# Patient Record
Sex: MALE | Race: ASIAN | NOT HISPANIC OR LATINO | URBAN - METROPOLITAN AREA
[De-identification: names, ages, dates, MRNs, and addresses within clinical notes are randomized per-mention and may not be internally consistent; named-entity substitution may affect disease eponyms.]

---

## 2017-01-03 ENCOUNTER — INPATIENT (INPATIENT)
Facility: HOSPITAL | Age: 22
LOS: 26 days | Discharge: HOME | End: 2017-01-30
Attending: PSYCHIATRY & NEUROLOGY | Admitting: PSYCHIATRY & NEUROLOGY
Payer: COMMERCIAL

## 2017-01-03 VITALS
RESPIRATION RATE: 16 BRPM | OXYGEN SATURATION: 100 % | SYSTOLIC BLOOD PRESSURE: 116 MMHG | TEMPERATURE: 98 F | HEART RATE: 99 BPM | DIASTOLIC BLOOD PRESSURE: 82 MMHG

## 2017-01-03 DIAGNOSIS — R69 ILLNESS, UNSPECIFIED: ICD-10-CM

## 2017-01-03 DIAGNOSIS — F32.9 MAJOR DEPRESSIVE DISORDER, SINGLE EPISODE, UNSPECIFIED: ICD-10-CM

## 2017-01-03 LAB
ALBUMIN SERPL ELPH-MCNC: 4.6 G/DL — SIGNIFICANT CHANGE UP (ref 3.3–5)
ALP SERPL-CCNC: 78 U/L — SIGNIFICANT CHANGE UP (ref 40–120)
ALT FLD-CCNC: 14 U/L — SIGNIFICANT CHANGE UP (ref 4–41)
AMPHET UR-MCNC: NEGATIVE — SIGNIFICANT CHANGE UP
APAP SERPL-MCNC: < 15 UG/ML — LOW (ref 15–25)
APPEARANCE UR: CLEAR — SIGNIFICANT CHANGE UP
AST SERPL-CCNC: 16 U/L — SIGNIFICANT CHANGE UP (ref 4–40)
BACTERIA # UR AUTO: SIGNIFICANT CHANGE UP
BARBITURATES MEASUREMENT: NEGATIVE — SIGNIFICANT CHANGE UP
BARBITURATES UR SCN-MCNC: NEGATIVE — SIGNIFICANT CHANGE UP
BASOPHILS # BLD AUTO: 0 K/UL — SIGNIFICANT CHANGE UP (ref 0–0.2)
BASOPHILS NFR BLD AUTO: 0 % — SIGNIFICANT CHANGE UP (ref 0–2)
BENZODIAZ SERPL-MCNC: NEGATIVE — SIGNIFICANT CHANGE UP
BENZODIAZ UR-MCNC: NEGATIVE — SIGNIFICANT CHANGE UP
BILIRUB SERPL-MCNC: 0.5 MG/DL — SIGNIFICANT CHANGE UP (ref 0.2–1.2)
BILIRUB UR-MCNC: SIGNIFICANT CHANGE UP
BLOOD UR QL VISUAL: HIGH
BUN SERPL-MCNC: 7 MG/DL — SIGNIFICANT CHANGE UP (ref 7–23)
CALCIUM SERPL-MCNC: 9.3 MG/DL — SIGNIFICANT CHANGE UP (ref 8.4–10.5)
CANNABINOIDS UR-MCNC: NEGATIVE — SIGNIFICANT CHANGE UP
CHLORIDE SERPL-SCNC: 101 MMOL/L — SIGNIFICANT CHANGE UP (ref 98–107)
CO2 SERPL-SCNC: 21 MMOL/L — LOW (ref 22–31)
COCAINE METAB.OTHER UR-MCNC: NEGATIVE — SIGNIFICANT CHANGE UP
COLOR SPEC: YELLOW — SIGNIFICANT CHANGE UP
CREAT SERPL-MCNC: 1.16 MG/DL — SIGNIFICANT CHANGE UP (ref 0.5–1.3)
EOSINOPHIL # BLD AUTO: 0.06 K/UL — SIGNIFICANT CHANGE UP (ref 0–0.5)
EOSINOPHIL NFR BLD AUTO: 0.6 % — SIGNIFICANT CHANGE UP (ref 0–6)
ETHANOL BLD-MCNC: < 10 MG/DL — SIGNIFICANT CHANGE UP
GLUCOSE SERPL-MCNC: 109 MG/DL — HIGH (ref 70–99)
GLUCOSE UR-MCNC: NEGATIVE — SIGNIFICANT CHANGE UP
HCT VFR BLD CALC: 41.5 % — SIGNIFICANT CHANGE UP (ref 39–50)
HGB BLD-MCNC: 13.9 G/DL — SIGNIFICANT CHANGE UP (ref 13–17)
IMM GRANULOCYTES NFR BLD AUTO: 0.3 % — SIGNIFICANT CHANGE UP (ref 0–1.5)
KETONES UR-MCNC: SIGNIFICANT CHANGE UP
LEUKOCYTE ESTERASE UR-ACNC: NEGATIVE — SIGNIFICANT CHANGE UP
LYMPHOCYTES # BLD AUTO: 3.86 K/UL — HIGH (ref 1–3.3)
LYMPHOCYTES # BLD AUTO: 39.8 % — SIGNIFICANT CHANGE UP (ref 13–44)
MCHC RBC-ENTMCNC: 28.1 PG — SIGNIFICANT CHANGE UP (ref 27–34)
MCHC RBC-ENTMCNC: 33.5 % — SIGNIFICANT CHANGE UP (ref 32–36)
MCV RBC AUTO: 84 FL — SIGNIFICANT CHANGE UP (ref 80–100)
METHADONE UR-MCNC: NEGATIVE — SIGNIFICANT CHANGE UP
MONOCYTES # BLD AUTO: 0.69 K/UL — SIGNIFICANT CHANGE UP (ref 0–0.9)
MONOCYTES NFR BLD AUTO: 7.1 % — SIGNIFICANT CHANGE UP (ref 2–14)
MUCOUS THREADS # UR AUTO: SIGNIFICANT CHANGE UP
NEUTROPHILS # BLD AUTO: 5.06 K/UL — SIGNIFICANT CHANGE UP (ref 1.8–7.4)
NEUTROPHILS NFR BLD AUTO: 52.2 % — SIGNIFICANT CHANGE UP (ref 43–77)
NITRITE UR-MCNC: NEGATIVE — SIGNIFICANT CHANGE UP
NON-SQ EPI CELLS # UR AUTO: <1 — SIGNIFICANT CHANGE UP
OPIATES UR-MCNC: NEGATIVE — SIGNIFICANT CHANGE UP
OXYCODONE UR-MCNC: NEGATIVE — SIGNIFICANT CHANGE UP
PCP UR-MCNC: NEGATIVE — SIGNIFICANT CHANGE UP
PH UR: 6.5 — SIGNIFICANT CHANGE UP (ref 4.6–8)
PLATELET # BLD AUTO: 293 K/UL — SIGNIFICANT CHANGE UP (ref 150–400)
PMV BLD: 10.5 FL — SIGNIFICANT CHANGE UP (ref 7–13)
POTASSIUM SERPL-MCNC: 3.5 MMOL/L — SIGNIFICANT CHANGE UP (ref 3.5–5.3)
POTASSIUM SERPL-SCNC: 3.5 MMOL/L — SIGNIFICANT CHANGE UP (ref 3.5–5.3)
PROT SERPL-MCNC: 7.6 G/DL — SIGNIFICANT CHANGE UP (ref 6–8.3)
PROT UR-MCNC: 30 — SIGNIFICANT CHANGE UP
RBC # BLD: 4.94 M/UL — SIGNIFICANT CHANGE UP (ref 4.2–5.8)
RBC # FLD: 13.1 % — SIGNIFICANT CHANGE UP (ref 10.3–14.5)
RBC CASTS # UR COMP ASSIST: SIGNIFICANT CHANGE UP (ref 0–?)
SALICYLATES SERPL-MCNC: < 15 MG/DL — LOW (ref 15–30)
SODIUM SERPL-SCNC: 141 MMOL/L — SIGNIFICANT CHANGE UP (ref 135–145)
SP GR SPEC: 1.03 — SIGNIFICANT CHANGE UP (ref 1–1.03)
SQUAMOUS # UR AUTO: SIGNIFICANT CHANGE UP
TSH SERPL-MCNC: 1.82 UIU/ML — SIGNIFICANT CHANGE UP (ref 0.27–4.2)
UROBILINOGEN FLD QL: 2 E.U. — SIGNIFICANT CHANGE UP (ref 0.1–0.2)
WBC # BLD: 9.7 K/UL — SIGNIFICANT CHANGE UP (ref 3.8–10.5)
WBC # FLD AUTO: 9.7 K/UL — SIGNIFICANT CHANGE UP (ref 3.8–10.5)
WBC UR QL: SIGNIFICANT CHANGE UP (ref 0–?)

## 2017-01-03 PROCEDURE — 99285 EMERGENCY DEPT VISIT HI MDM: CPT | Mod: GC

## 2017-01-03 RX ORDER — ALBUTEROL 90 UG/1
2 AEROSOL, METERED ORAL EVERY 6 HOURS
Qty: 0 | Refills: 0 | Status: DISCONTINUED | OUTPATIENT
Start: 2017-01-03 | End: 2017-01-30

## 2017-01-03 RX ORDER — DIPHENHYDRAMINE HCL 50 MG
50 CAPSULE ORAL ONCE
Qty: 0 | Refills: 0 | Status: COMPLETED | OUTPATIENT
Start: 2017-01-03 | End: 2017-01-03

## 2017-01-03 RX ORDER — SERTRALINE 25 MG/1
25 TABLET, FILM COATED ORAL DAILY
Qty: 0 | Refills: 0 | Status: DISCONTINUED | OUTPATIENT
Start: 2017-01-03 | End: 2017-01-05

## 2017-01-03 RX ORDER — PANTOPRAZOLE SODIUM 20 MG/1
40 TABLET, DELAYED RELEASE ORAL ONCE
Qty: 0 | Refills: 0 | Status: COMPLETED | OUTPATIENT
Start: 2017-01-03 | End: 2017-01-03

## 2017-01-03 RX ADMIN — Medication 30 MILLILITER(S): at 14:23

## 2017-01-03 RX ADMIN — Medication 50 MILLIGRAM(S): at 22:02

## 2017-01-03 RX ADMIN — Medication 2 MILLIGRAM(S): at 21:14

## 2017-01-03 RX ADMIN — PANTOPRAZOLE SODIUM 40 MILLIGRAM(S): 20 TABLET, DELAYED RELEASE ORAL at 14:31

## 2017-01-03 RX ADMIN — Medication 2 MILLIGRAM(S): at 14:23

## 2017-01-03 NOTE — ED ADULT TRIAGE NOTE - CHIEF COMPLAINT QUOTE
Pt c/o difficulty sleeping and feeling anxious and depressed x 1 week. Pt requesting to see psychiatrist. Denies SI/HI at this time, but states he had suicidal thoughts a few days ago. Denies drug or alcohol use. Pt calm and cooperative. Denies any PMH

## 2017-01-03 NOTE — ED BEHAVIORAL HEALTH ASSESSMENT NOTE - CASE SUMMARY
21 yr old single Citizen of [place] M, single, domiciled with family history of NSSIB (cutting - last cut 1.5 years ago) was BIB by mother for psych eval. Patient presents with depressed mood, suicidal ideation with plan with die via OD on CO. Inpt psych hosp is necessary for safety, management and stabilization of symptoms.

## 2017-01-03 NOTE — ED BEHAVIORAL HEALTH NOTE - BEHAVIORAL HEALTH NOTE
Collateral from Mother - Hanna    Patient has been reporting he's fine when family asks him how he is. He has been working regularly. She found [employer] boxes with pipes and when she asked him about it, he started crying and saying he was having thoughts of wanting to kill himself. He was also telling her how lonely he felt and how he had no friends. She took him to the PMD who in turn recommended that he come to the hospital. Mother also reports that pt had told her in the past that he's had SI when in High school and was supposed to see a psychiatrist, but never went to the appointment.

## 2017-01-03 NOTE — ED PROVIDER NOTE - MEDICAL DECISION MAKING DETAILS
Medical evaluation performed. There is no clinical evidence of intoxication or any acute medical problem requiring immediate intervention. Final disposition will be determined by psychiatrist. This is a 21 year old male BIB family for psych eval R/t decreased sleeping. Medical evaluation performed. There is no clinical evidence of intoxication or any acute medical problem requiring immediate intervention. Final disposition will be determined by psychiatrist.

## 2017-01-03 NOTE — ED BEHAVIORAL HEALTH ASSESSMENT NOTE - RISK ASSESSMENT
Patient is a high chronic suicidal risk at this time given the severity and high lethal level of his plan. His current mood episode, his family history of depression and suicidality as well as his hx of NSSIB, and his poor reactivity to stressors, difficulty expressing emotions, low frustration tolerance all make him high risk. Protective factors include good access to health care, ability to learn, supportive social network and he has no active substance use.

## 2017-01-03 NOTE — ED BEHAVIORAL HEALTH ASSESSMENT NOTE - HPI (INCLUDE ILLNESS QUALITY, SEVERITY, DURATION, TIMING, CONTEXT, MODIFYING FACTORS, ASSOCIATED SIGNS AND SYMPTOMS)
21 yr old Latoya REDDY, single, domiciled with family in private residence, kicked out of college 1.5 years ago, no substance hx, no legal issues, no past SA, +NSSIB (cutting - last cut 1.5 years ago) was BIB by mother after PMD instructed her to bring him to Psych ED for evaluation for suicidality, insomnia and depression.     Patient reports that he has not slept in 1.2 weeks after he found out a girl that he liked "forgot about him". He does report that he's only slept approximately 2-4 hours/night in the past week and half and sometimes he does not sleep at all. Patient does report that he's been having suicidal thoughts as well though is very guarded in telling them to writer as he feels they will "keep him locked up longer" and "he just wants to sleep". Patient reports almost daily suicidal thoughts and he even ordered pipes to run the exhaust from his car to poison himself. He also reports ordering carbon monoxide tanks but says that he cancelled the order. His mother found out 2 days ago and when she questioned patient, he became tearful and endorsed that he wanted to hurt himself. Mother brought him to the PMD today who then referred patient to the ED.     Patient reports that he had similar feelings while he was in high school. His grades dropped from straight As to barely passing (50s) and he had very worthless thoughts about himself and even used a blade to carve "FAILURE" into his arm. He said his friends had also abandoned him at this time and taunted him for actually being a failure. When he went to college 1.5yrs ago he reported that he failed most classes and stopped going to them and eventually got kicked out so he started working as a  in his parents company which he has been doing for the last 1.5 years.     Patient endorses poor sleep, restless feelings, depressed mood, feelings of worthlessness and has had suicidal intent with a clear plan. He also reports strong feelings of fear of abandonment, no sense of self, intense interpersonal relationships (with friends) and chronic suicidal thoughts 21 yr old Latoya REDDY, single, domiciled with family in private residence, kicked out of college 1.5 years ago, no substance hx, no legal issues, no past SA, +NSSIB (cutting - last cut 1.5 years ago) was BIB by mother after PMD instructed her to bring him to Psych ED for evaluation for suicidality, insomnia and depression.     Patient reports that he has not slept in 1.2 weeks after he found out a girl that he liked "forgot about him". He does report that he's only slept approximately 2-4 hours/night in the past week and half and sometimes he does not sleep at all. Patient does report that he's been having suicidal thoughts as well though is very guarded in telling them to writer as he feels they will "keep him locked up longer" and "he just wants to sleep". Patient reports almost daily suicidal thoughts and he even ordered pipes to run the exhaust from his car to poison himself. He also reports ordering carbon monoxide tanks but says that he cancelled the order. His mother found out 2 days ago and when she questioned patient, he became tearful and endorsed that he wanted to hurt himself. Mother brought him to the PMD today who then referred patient to the ED.     Patient reports that he had similar feelings while he was in high school. His grades dropped from straight As to barely passing (50s) and he had very worthless thoughts about himself and even used a blade to carve "FAILURE" into his arm. He said his friends had also abandoned him at this time and taunted him for actually being a failure. When he went to college 1.5yrs ago he reported that he failed most classes and stopped going to them and eventually got kicked out so he started working as a  in his parents company which he has been doing for the last 1.5 years.     Patient endorses poor sleep, restless feelings, depressed mood, feelings of worthlessness and has had suicidal intent with a clear plan. He also reports strong feelings of fear of abandonment, no sense of self, intense interpersonal relationships (with friends) and chronic suicidal thoughts but has never acted on them in the past.

## 2017-01-03 NOTE — ED BEHAVIORAL HEALTH ASSESSMENT NOTE - DESCRIPTION
Anxious in room. Shaking legs but cooperative with interview. Asthma Latoya, [place], Failed out of college 1.5 years ago. 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 Lives with parents and brothers

## 2017-01-03 NOTE — ED BEHAVIORAL HEALTH ASSESSMENT NOTE - SUMMARY
21 yr old Latoya REDDY, single, domiciled with family in private residence, kicked out of college 1.5 years ago, no substance hx, no legal issues, no past SA, +NSSIB (cutting - last cut 1.5 years ago) was BIB by mother after PMD instructed her to bring him to Psych ED for evaluation for suicidality, insomnia and depression. Patient reports that over the past two weeks his suicidal ideation has got increasingly worse and developed a plan with intent to kill himself. Also reports feelings of worthlessness, has insomnia, has had poor appetite, anhenodia over the last two weeks but appears these symptoms have been present since high school. Patient also endorses some fear of abandonment, NSSIB, chronic suicidal ideation as well as a poor sense of self. Given the lethality of his suicide plan, patient needs to be psychiatrically hospitalized for stabilization and safety.

## 2017-01-03 NOTE — ED PROVIDER NOTE - PHYSICAL EXAMINATION
VSS DENIES PAIN SOB N/V NO S/S OF CARDIAC OR RESPIRATORY DISTRESS PT AMBULATES AND PERFORMS ADL'S INDEPENDENTLY TOLERATING PO INTAKE VSS DENIES PAIN SOB N/V NO S/S OF CARDIAC OR RESPIRATORY DISTRESS PT AMBULATES AND PERFORMS ADL'S INDEPENDENTLY TOLERATING PO INTAKE denies cutting  behavior

## 2017-01-03 NOTE — ED BEHAVIORAL HEALTH ASSESSMENT NOTE - DETAILS
Ordered pipes to poison himself with carbon monoxide Maternal Grandfather - alcohol with intent to die Maternal Grandfather Dr. Baker Mother aware of plan

## 2017-01-03 NOTE — ED PROVIDER NOTE - OBJECTIVE STATEMENT
This is a 21 year old male BIB family for psych eval R/t decreased sleeping.  patient reports he has not been sleeping for the past few weeks and unable to drink . Patient has suicidal thoughts no plan  States increased anxiety and depression and want to speak to a psychologist. Denies chest pain, SOB, N/V/D and fevers, Denies palpitations or diaphoresis. Denies Numbness, Tingling, Blurry Vision and HA.  Denies homicidal thoughts. Denies visual/auditory hallucinations. Denies ETOH/Illicit drug use. Denies recent falls, trauma and injuries. Denies pain or any other medical complaints.

## 2017-01-04 PROCEDURE — 99233 SBSQ HOSP IP/OBS HIGH 50: CPT

## 2017-01-04 RX ORDER — DIPHENHYDRAMINE HCL 50 MG
50 CAPSULE ORAL EVERY 6 HOURS
Qty: 0 | Refills: 0 | Status: DISCONTINUED | OUTPATIENT
Start: 2017-01-04 | End: 2017-01-22

## 2017-01-04 RX ORDER — CLONAZEPAM 1 MG
0.5 TABLET ORAL AT BEDTIME
Qty: 0 | Refills: 0 | Status: DISCONTINUED | OUTPATIENT
Start: 2017-01-04 | End: 2017-01-09

## 2017-01-04 RX ORDER — DIPHENHYDRAMINE HCL 50 MG
50 CAPSULE ORAL ONCE
Qty: 0 | Refills: 0 | Status: DISCONTINUED | OUTPATIENT
Start: 2017-01-04 | End: 2017-01-30

## 2017-01-04 RX ADMIN — Medication 0.5 MILLIGRAM(S): at 20:49

## 2017-01-04 RX ADMIN — SERTRALINE 25 MILLIGRAM(S): 25 TABLET, FILM COATED ORAL at 09:30

## 2017-01-04 RX ADMIN — Medication 50 MILLIGRAM(S): at 11:48

## 2017-01-05 PROCEDURE — 99232 SBSQ HOSP IP/OBS MODERATE 35: CPT

## 2017-01-05 RX ORDER — IBUPROFEN 200 MG
600 TABLET ORAL EVERY 6 HOURS
Qty: 0 | Refills: 0 | Status: DISCONTINUED | OUTPATIENT
Start: 2017-01-05 | End: 2017-01-30

## 2017-01-05 RX ORDER — SERTRALINE 25 MG/1
50 TABLET, FILM COATED ORAL DAILY
Qty: 0 | Refills: 0 | Status: DISCONTINUED | OUTPATIENT
Start: 2017-01-06 | End: 2017-01-08

## 2017-01-05 RX ADMIN — SERTRALINE 25 MILLIGRAM(S): 25 TABLET, FILM COATED ORAL at 08:21

## 2017-01-05 RX ADMIN — Medication 30 MILLILITER(S): at 22:39

## 2017-01-05 RX ADMIN — Medication 600 MILLIGRAM(S): at 19:18

## 2017-01-05 RX ADMIN — Medication 50 MILLIGRAM(S): at 21:28

## 2017-01-05 RX ADMIN — Medication 600 MILLIGRAM(S): at 13:43

## 2017-01-05 RX ADMIN — Medication 1 MILLIGRAM(S): at 13:45

## 2017-01-05 RX ADMIN — Medication 0.5 MILLIGRAM(S): at 21:28

## 2017-01-06 PROCEDURE — 99232 SBSQ HOSP IP/OBS MODERATE 35: CPT

## 2017-01-06 RX ADMIN — Medication 0.5 MILLIGRAM(S): at 21:48

## 2017-01-06 RX ADMIN — SERTRALINE 50 MILLIGRAM(S): 25 TABLET, FILM COATED ORAL at 09:17

## 2017-01-07 PROCEDURE — 99232 SBSQ HOSP IP/OBS MODERATE 35: CPT

## 2017-01-07 RX ADMIN — SERTRALINE 50 MILLIGRAM(S): 25 TABLET, FILM COATED ORAL at 09:09

## 2017-01-07 RX ADMIN — Medication 0.5 MILLIGRAM(S): at 21:52

## 2017-01-07 RX ADMIN — Medication 50 MILLIGRAM(S): at 20:55

## 2017-01-08 PROCEDURE — 99232 SBSQ HOSP IP/OBS MODERATE 35: CPT

## 2017-01-08 RX ORDER — SERTRALINE 25 MG/1
75 TABLET, FILM COATED ORAL DAILY
Qty: 0 | Refills: 0 | Status: DISCONTINUED | OUTPATIENT
Start: 2017-01-08 | End: 2017-01-13

## 2017-01-08 RX ADMIN — SERTRALINE 50 MILLIGRAM(S): 25 TABLET, FILM COATED ORAL at 08:34

## 2017-01-08 RX ADMIN — Medication 0.5 MILLIGRAM(S): at 21:48

## 2017-01-09 PROCEDURE — 99232 SBSQ HOSP IP/OBS MODERATE 35: CPT

## 2017-01-09 RX ORDER — CLONAZEPAM 1 MG
0.5 TABLET ORAL AT BEDTIME
Qty: 0 | Refills: 0 | Status: DISCONTINUED | OUTPATIENT
Start: 2017-01-09 | End: 2017-01-13

## 2017-01-09 RX ADMIN — Medication 0.5 MILLIGRAM(S): at 22:02

## 2017-01-09 RX ADMIN — SERTRALINE 75 MILLIGRAM(S): 25 TABLET, FILM COATED ORAL at 08:17

## 2017-01-09 RX ADMIN — Medication 50 MILLIGRAM(S): at 22:02

## 2017-01-10 PROCEDURE — 99232 SBSQ HOSP IP/OBS MODERATE 35: CPT

## 2017-01-10 RX ADMIN — Medication 0.5 MILLIGRAM(S): at 22:19

## 2017-01-10 RX ADMIN — SERTRALINE 75 MILLIGRAM(S): 25 TABLET, FILM COATED ORAL at 09:10

## 2017-01-10 RX ADMIN — Medication 50 MILLIGRAM(S): at 22:20

## 2017-01-11 PROCEDURE — 99232 SBSQ HOSP IP/OBS MODERATE 35: CPT

## 2017-01-11 RX ADMIN — SERTRALINE 75 MILLIGRAM(S): 25 TABLET, FILM COATED ORAL at 10:10

## 2017-01-11 RX ADMIN — Medication 0.5 MILLIGRAM(S): at 22:33

## 2017-01-11 RX ADMIN — Medication 50 MILLIGRAM(S): at 22:34

## 2017-01-12 PROCEDURE — 99232 SBSQ HOSP IP/OBS MODERATE 35: CPT

## 2017-01-12 RX ADMIN — Medication 0.5 MILLIGRAM(S): at 22:46

## 2017-01-12 RX ADMIN — Medication 50 MILLIGRAM(S): at 22:46

## 2017-01-12 RX ADMIN — Medication 600 MILLIGRAM(S): at 22:46

## 2017-01-12 RX ADMIN — Medication 600 MILLIGRAM(S): at 22:25

## 2017-01-12 RX ADMIN — SERTRALINE 75 MILLIGRAM(S): 25 TABLET, FILM COATED ORAL at 09:02

## 2017-01-13 PROCEDURE — 99232 SBSQ HOSP IP/OBS MODERATE 35: CPT

## 2017-01-13 RX ORDER — CLONAZEPAM 1 MG
0.5 TABLET ORAL AT BEDTIME
Qty: 0 | Refills: 0 | Status: DISCONTINUED | OUTPATIENT
Start: 2017-01-13 | End: 2017-01-18

## 2017-01-13 RX ORDER — SERTRALINE 25 MG/1
100 TABLET, FILM COATED ORAL DAILY
Qty: 0 | Refills: 0 | Status: DISCONTINUED | OUTPATIENT
Start: 2017-01-13 | End: 2017-01-17

## 2017-01-13 RX ADMIN — Medication 50 MILLIGRAM(S): at 22:32

## 2017-01-13 RX ADMIN — Medication 0.5 MILLIGRAM(S): at 22:32

## 2017-01-13 RX ADMIN — SERTRALINE 100 MILLIGRAM(S): 25 TABLET, FILM COATED ORAL at 11:09

## 2017-01-14 RX ADMIN — Medication 1 MILLIGRAM(S): at 14:09

## 2017-01-14 RX ADMIN — Medication 50 MILLIGRAM(S): at 21:39

## 2017-01-14 RX ADMIN — Medication 0.5 MILLIGRAM(S): at 21:39

## 2017-01-14 RX ADMIN — Medication 50 MILLIGRAM(S): at 14:09

## 2017-01-14 RX ADMIN — SERTRALINE 100 MILLIGRAM(S): 25 TABLET, FILM COATED ORAL at 10:00

## 2017-01-15 RX ADMIN — Medication 600 MILLIGRAM(S): at 22:40

## 2017-01-15 RX ADMIN — Medication 0.5 MILLIGRAM(S): at 22:40

## 2017-01-15 RX ADMIN — Medication 50 MILLIGRAM(S): at 22:40

## 2017-01-15 RX ADMIN — SERTRALINE 100 MILLIGRAM(S): 25 TABLET, FILM COATED ORAL at 08:15

## 2017-01-16 RX ADMIN — Medication 0.5 MILLIGRAM(S): at 22:20

## 2017-01-16 RX ADMIN — Medication 50 MILLIGRAM(S): at 22:21

## 2017-01-16 RX ADMIN — SERTRALINE 100 MILLIGRAM(S): 25 TABLET, FILM COATED ORAL at 08:53

## 2017-01-16 RX ADMIN — Medication 600 MILLIGRAM(S): at 22:20

## 2017-01-17 PROCEDURE — 99232 SBSQ HOSP IP/OBS MODERATE 35: CPT

## 2017-01-17 RX ORDER — SERTRALINE 25 MG/1
125 TABLET, FILM COATED ORAL DAILY
Qty: 0 | Refills: 0 | Status: DISCONTINUED | OUTPATIENT
Start: 2017-01-17 | End: 2017-01-20

## 2017-01-17 RX ADMIN — Medication 50 MILLIGRAM(S): at 22:10

## 2017-01-17 RX ADMIN — Medication 600 MILLIGRAM(S): at 22:55

## 2017-01-17 RX ADMIN — Medication 600 MILLIGRAM(S): at 22:10

## 2017-01-17 RX ADMIN — Medication 0.5 MILLIGRAM(S): at 22:10

## 2017-01-17 RX ADMIN — SERTRALINE 100 MILLIGRAM(S): 25 TABLET, FILM COATED ORAL at 09:03

## 2017-01-18 PROCEDURE — 99232 SBSQ HOSP IP/OBS MODERATE 35: CPT

## 2017-01-18 RX ORDER — CLONAZEPAM 1 MG
0.5 TABLET ORAL AT BEDTIME
Qty: 0 | Refills: 0 | Status: DISCONTINUED | OUTPATIENT
Start: 2017-01-18 | End: 2017-01-23

## 2017-01-18 RX ADMIN — Medication 600 MILLIGRAM(S): at 21:53

## 2017-01-18 RX ADMIN — Medication 0.5 MILLIGRAM(S): at 21:52

## 2017-01-18 RX ADMIN — SERTRALINE 125 MILLIGRAM(S): 25 TABLET, FILM COATED ORAL at 08:49

## 2017-01-18 RX ADMIN — Medication 50 MILLIGRAM(S): at 10:53

## 2017-01-19 PROCEDURE — 99232 SBSQ HOSP IP/OBS MODERATE 35: CPT

## 2017-01-19 RX ORDER — LANOLIN ALCOHOL/MO/W.PET/CERES
3 CREAM (GRAM) TOPICAL AT BEDTIME
Qty: 0 | Refills: 0 | Status: DISCONTINUED | OUTPATIENT
Start: 2017-01-19 | End: 2017-01-30

## 2017-01-19 RX ADMIN — Medication 30 MILLILITER(S): at 00:28

## 2017-01-19 RX ADMIN — Medication 3 MILLIGRAM(S): at 00:38

## 2017-01-19 RX ADMIN — Medication 50 MILLIGRAM(S): at 08:49

## 2017-01-19 RX ADMIN — Medication 600 MILLIGRAM(S): at 00:25

## 2017-01-19 RX ADMIN — SERTRALINE 125 MILLIGRAM(S): 25 TABLET, FILM COATED ORAL at 08:48

## 2017-01-19 RX ADMIN — Medication 0.5 MILLIGRAM(S): at 21:40

## 2017-01-20 PROCEDURE — 99232 SBSQ HOSP IP/OBS MODERATE 35: CPT

## 2017-01-20 RX ORDER — SERTRALINE 25 MG/1
150 TABLET, FILM COATED ORAL DAILY
Qty: 0 | Refills: 0 | Status: DISCONTINUED | OUTPATIENT
Start: 2017-01-20 | End: 2017-01-30

## 2017-01-20 RX ADMIN — SERTRALINE 125 MILLIGRAM(S): 25 TABLET, FILM COATED ORAL at 09:24

## 2017-01-20 RX ADMIN — Medication 0.5 MILLIGRAM(S): at 22:35

## 2017-01-20 RX ADMIN — Medication 50 MILLIGRAM(S): at 22:35

## 2017-01-21 RX ADMIN — Medication 600 MILLIGRAM(S): at 23:00

## 2017-01-21 RX ADMIN — Medication 50 MILLIGRAM(S): at 23:00

## 2017-01-21 RX ADMIN — Medication 0.5 MILLIGRAM(S): at 22:04

## 2017-01-21 RX ADMIN — SERTRALINE 150 MILLIGRAM(S): 25 TABLET, FILM COATED ORAL at 09:46

## 2017-01-22 RX ORDER — DIPHENHYDRAMINE HCL 50 MG
25 CAPSULE ORAL EVERY 6 HOURS
Qty: 0 | Refills: 0 | Status: DISCONTINUED | OUTPATIENT
Start: 2017-01-22 | End: 2017-01-30

## 2017-01-22 RX ADMIN — Medication 25 MILLIGRAM(S): at 22:02

## 2017-01-22 RX ADMIN — Medication 600 MILLIGRAM(S): at 22:01

## 2017-01-22 RX ADMIN — Medication 25 MILLIGRAM(S): at 13:24

## 2017-01-22 RX ADMIN — Medication 600 MILLIGRAM(S): at 00:59

## 2017-01-22 RX ADMIN — SERTRALINE 150 MILLIGRAM(S): 25 TABLET, FILM COATED ORAL at 10:00

## 2017-01-23 PROCEDURE — 99232 SBSQ HOSP IP/OBS MODERATE 35: CPT

## 2017-01-23 RX ORDER — CLONAZEPAM 1 MG
0.5 TABLET ORAL AT BEDTIME
Qty: 0 | Refills: 0 | Status: DISCONTINUED | OUTPATIENT
Start: 2017-01-23 | End: 2017-01-30

## 2017-01-23 RX ADMIN — Medication 0.5 MILLIGRAM(S): at 22:28

## 2017-01-23 RX ADMIN — SERTRALINE 150 MILLIGRAM(S): 25 TABLET, FILM COATED ORAL at 09:09

## 2017-01-23 RX ADMIN — Medication 3 MILLIGRAM(S): at 22:29

## 2017-01-23 RX ADMIN — Medication 600 MILLIGRAM(S): at 22:51

## 2017-01-23 RX ADMIN — Medication 25 MILLIGRAM(S): at 22:28

## 2017-01-23 RX ADMIN — Medication 600 MILLIGRAM(S): at 22:28

## 2017-01-23 RX ADMIN — Medication 25 MILLIGRAM(S): at 09:23

## 2017-01-24 PROCEDURE — 99232 SBSQ HOSP IP/OBS MODERATE 35: CPT

## 2017-01-24 RX ADMIN — Medication 25 MILLIGRAM(S): at 19:41

## 2017-01-24 RX ADMIN — SERTRALINE 150 MILLIGRAM(S): 25 TABLET, FILM COATED ORAL at 09:57

## 2017-01-24 RX ADMIN — Medication 0.5 MILLIGRAM(S): at 20:28

## 2017-01-24 RX ADMIN — Medication 600 MILLIGRAM(S): at 15:43

## 2017-01-25 PROCEDURE — 99232 SBSQ HOSP IP/OBS MODERATE 35: CPT

## 2017-01-25 RX ADMIN — Medication 600 MILLIGRAM(S): at 21:10

## 2017-01-25 RX ADMIN — SERTRALINE 150 MILLIGRAM(S): 25 TABLET, FILM COATED ORAL at 09:31

## 2017-01-25 RX ADMIN — Medication 0.5 MILLIGRAM(S): at 21:40

## 2017-01-25 RX ADMIN — Medication 600 MILLIGRAM(S): at 23:00

## 2017-01-25 RX ADMIN — Medication 25 MILLIGRAM(S): at 23:13

## 2017-01-25 RX ADMIN — Medication 25 MILLIGRAM(S): at 09:30

## 2017-01-26 PROCEDURE — 99232 SBSQ HOSP IP/OBS MODERATE 35: CPT

## 2017-01-26 RX ORDER — SERTRALINE 25 MG/1
3 TABLET, FILM COATED ORAL
Qty: 45 | Refills: 0 | OUTPATIENT
Start: 2017-01-26 | End: 2017-02-10

## 2017-01-26 RX ORDER — CLONAZEPAM 1 MG
1 TABLET ORAL
Qty: 15 | Refills: 0 | OUTPATIENT
Start: 2017-01-26 | End: 2017-02-10

## 2017-01-26 RX ORDER — ALBUTEROL 90 UG/1
2 AEROSOL, METERED ORAL
Qty: 0 | Refills: 0 | COMMUNITY
Start: 2017-01-26

## 2017-01-26 RX ORDER — DIPHENHYDRAMINE HCL 50 MG
1 CAPSULE ORAL
Qty: 30 | Refills: 0 | OUTPATIENT
Start: 2017-01-26 | End: 2017-02-10

## 2017-01-26 RX ORDER — SERTRALINE 25 MG/1
3 TABLET, FILM COATED ORAL
Qty: 0 | Refills: 0 | COMMUNITY
Start: 2017-01-26

## 2017-01-26 RX ORDER — ALBUTEROL 90 UG/1
2 AEROSOL, METERED ORAL
Qty: 1 | Refills: 0 | OUTPATIENT
Start: 2017-01-26 | End: 2017-02-25

## 2017-01-26 RX ORDER — CLONAZEPAM 1 MG
1 TABLET ORAL
Qty: 0 | Refills: 0 | COMMUNITY
Start: 2017-01-26

## 2017-01-26 RX ADMIN — Medication 0.5 MILLIGRAM(S): at 22:39

## 2017-01-26 RX ADMIN — Medication 25 MILLIGRAM(S): at 22:39

## 2017-01-26 RX ADMIN — Medication 600 MILLIGRAM(S): at 14:52

## 2017-01-26 RX ADMIN — Medication 600 MILLIGRAM(S): at 22:39

## 2017-01-26 RX ADMIN — Medication 600 MILLIGRAM(S): at 21:35

## 2017-01-26 RX ADMIN — SERTRALINE 150 MILLIGRAM(S): 25 TABLET, FILM COATED ORAL at 09:03

## 2017-01-26 RX ADMIN — Medication 3 MILLIGRAM(S): at 22:39

## 2017-01-27 PROCEDURE — 99232 SBSQ HOSP IP/OBS MODERATE 35: CPT

## 2017-01-27 RX ADMIN — Medication 25 MILLIGRAM(S): at 20:49

## 2017-01-27 RX ADMIN — Medication 0.5 MILLIGRAM(S): at 22:27

## 2017-01-27 RX ADMIN — SERTRALINE 150 MILLIGRAM(S): 25 TABLET, FILM COATED ORAL at 08:53

## 2017-01-27 RX ADMIN — Medication 3 MILLIGRAM(S): at 22:27

## 2017-01-28 RX ADMIN — Medication 25 MILLIGRAM(S): at 20:43

## 2017-01-28 RX ADMIN — Medication 600 MILLIGRAM(S): at 20:44

## 2017-01-28 RX ADMIN — Medication 0.5 MILLIGRAM(S): at 20:43

## 2017-01-28 RX ADMIN — SERTRALINE 150 MILLIGRAM(S): 25 TABLET, FILM COATED ORAL at 09:25

## 2017-01-28 RX ADMIN — Medication 600 MILLIGRAM(S): at 22:02

## 2017-01-29 RX ADMIN — Medication 0.5 MILLIGRAM(S): at 21:44

## 2017-01-29 RX ADMIN — SERTRALINE 150 MILLIGRAM(S): 25 TABLET, FILM COATED ORAL at 11:15

## 2017-01-30 VITALS — TEMPERATURE: 98 F

## 2017-01-30 PROCEDURE — 99239 HOSP IP/OBS DSCHRG MGMT >30: CPT

## 2017-01-30 RX ADMIN — SERTRALINE 150 MILLIGRAM(S): 25 TABLET, FILM COATED ORAL at 08:56

## 2017-01-31 ENCOUNTER — OUTPATIENT (OUTPATIENT)
Dept: OUTPATIENT SERVICES | Facility: HOSPITAL | Age: 22
LOS: 1 days | Discharge: HOME | End: 2017-01-31

## 2017-01-31 PROBLEM — K21.9 GASTRO-ESOPHAGEAL REFLUX DISEASE WITHOUT ESOPHAGITIS: Chronic | Status: ACTIVE | Noted: 2017-01-03

## 2017-02-01 DIAGNOSIS — F60.3 BORDERLINE PERSONALITY DISORDER: ICD-10-CM

## 2017-02-18 ENCOUNTER — EMERGENCY (EMERGENCY)
Facility: HOSPITAL | Age: 22
LOS: 1 days | Discharge: HOME | End: 2017-02-18
Attending: EMERGENCY MEDICINE | Admitting: EMERGENCY MEDICINE
Payer: MEDICAID

## 2017-02-18 VITALS
RESPIRATION RATE: 16 BRPM | OXYGEN SATURATION: 100 % | SYSTOLIC BLOOD PRESSURE: 130 MMHG | HEART RATE: 95 BPM | TEMPERATURE: 98 F | DIASTOLIC BLOOD PRESSURE: 83 MMHG

## 2017-02-18 VITALS
HEART RATE: 100 BPM | OXYGEN SATURATION: 100 % | DIASTOLIC BLOOD PRESSURE: 80 MMHG | SYSTOLIC BLOOD PRESSURE: 141 MMHG | RESPIRATION RATE: 20 BRPM

## 2017-02-18 DIAGNOSIS — F43.20 ADJUSTMENT DISORDER, UNSPECIFIED: ICD-10-CM

## 2017-02-18 DIAGNOSIS — F60.3 BORDERLINE PERSONALITY DISORDER: ICD-10-CM

## 2017-02-18 DIAGNOSIS — F32.4 MAJOR DEPRESSIVE DISORDER, SINGLE EPISODE, IN PARTIAL REMISSION: ICD-10-CM

## 2017-02-18 LAB
ALBUMIN SERPL ELPH-MCNC: 4.4 G/DL — SIGNIFICANT CHANGE UP (ref 3.3–5)
ALP SERPL-CCNC: 100 U/L — SIGNIFICANT CHANGE UP (ref 40–120)
ALT FLD-CCNC: 15 U/L — SIGNIFICANT CHANGE UP (ref 4–41)
AMPHET UR-MCNC: POSITIVE — SIGNIFICANT CHANGE UP
APAP SERPL-MCNC: < 15 UG/ML — LOW (ref 15–25)
APPEARANCE UR: CLEAR — SIGNIFICANT CHANGE UP
AST SERPL-CCNC: 14 U/L — SIGNIFICANT CHANGE UP (ref 4–40)
BARBITURATES MEASUREMENT: NEGATIVE — SIGNIFICANT CHANGE UP
BARBITURATES UR SCN-MCNC: NEGATIVE — SIGNIFICANT CHANGE UP
BASOPHILS # BLD AUTO: 0.01 K/UL — SIGNIFICANT CHANGE UP (ref 0–0.2)
BASOPHILS NFR BLD AUTO: 0.1 % — SIGNIFICANT CHANGE UP (ref 0–2)
BENZODIAZ SERPL-MCNC: NEGATIVE — SIGNIFICANT CHANGE UP
BENZODIAZ UR-MCNC: NEGATIVE — SIGNIFICANT CHANGE UP
BILIRUB SERPL-MCNC: 0.5 MG/DL — SIGNIFICANT CHANGE UP (ref 0.2–1.2)
BILIRUB UR-MCNC: NEGATIVE — SIGNIFICANT CHANGE UP
BLOOD UR QL VISUAL: NEGATIVE — SIGNIFICANT CHANGE UP
BUN SERPL-MCNC: 10 MG/DL — SIGNIFICANT CHANGE UP (ref 7–23)
CALCIUM SERPL-MCNC: 9.1 MG/DL — SIGNIFICANT CHANGE UP (ref 8.4–10.5)
CANNABINOIDS UR-MCNC: NEGATIVE — SIGNIFICANT CHANGE UP
CHLORIDE SERPL-SCNC: 102 MMOL/L — SIGNIFICANT CHANGE UP (ref 98–107)
CO2 SERPL-SCNC: 22 MMOL/L — SIGNIFICANT CHANGE UP (ref 22–31)
COCAINE METAB.OTHER UR-MCNC: NEGATIVE — SIGNIFICANT CHANGE UP
COLOR SPEC: YELLOW — SIGNIFICANT CHANGE UP
CREAT SERPL-MCNC: 1.15 MG/DL — SIGNIFICANT CHANGE UP (ref 0.5–1.3)
EOSINOPHIL # BLD AUTO: 0.06 K/UL — SIGNIFICANT CHANGE UP (ref 0–0.5)
EOSINOPHIL NFR BLD AUTO: 0.5 % — SIGNIFICANT CHANGE UP (ref 0–6)
ETHANOL BLD-MCNC: < 10 MG/DL — SIGNIFICANT CHANGE UP
GLUCOSE SERPL-MCNC: 87 MG/DL — SIGNIFICANT CHANGE UP (ref 70–99)
GLUCOSE UR-MCNC: NEGATIVE — SIGNIFICANT CHANGE UP
HCT VFR BLD CALC: 43.7 % — SIGNIFICANT CHANGE UP (ref 39–50)
HGB BLD-MCNC: 14.3 G/DL — SIGNIFICANT CHANGE UP (ref 13–17)
IMM GRANULOCYTES NFR BLD AUTO: 0.3 % — SIGNIFICANT CHANGE UP (ref 0–1.5)
KETONES UR-MCNC: SIGNIFICANT CHANGE UP
LEUKOCYTE ESTERASE UR-ACNC: NEGATIVE — SIGNIFICANT CHANGE UP
LYMPHOCYTES # BLD AUTO: 27.5 % — SIGNIFICANT CHANGE UP (ref 13–44)
LYMPHOCYTES # BLD AUTO: 3.15 K/UL — SIGNIFICANT CHANGE UP (ref 1–3.3)
MCHC RBC-ENTMCNC: 28.8 PG — SIGNIFICANT CHANGE UP (ref 27–34)
MCHC RBC-ENTMCNC: 32.7 % — SIGNIFICANT CHANGE UP (ref 32–36)
MCV RBC AUTO: 87.9 FL — SIGNIFICANT CHANGE UP (ref 80–100)
METHADONE UR-MCNC: NEGATIVE — SIGNIFICANT CHANGE UP
MONOCYTES # BLD AUTO: 0.71 K/UL — SIGNIFICANT CHANGE UP (ref 0–0.9)
MONOCYTES NFR BLD AUTO: 6.2 % — SIGNIFICANT CHANGE UP (ref 2–14)
MUCOUS THREADS # UR AUTO: SIGNIFICANT CHANGE UP
NEUTROPHILS # BLD AUTO: 7.49 K/UL — HIGH (ref 1.8–7.4)
NEUTROPHILS NFR BLD AUTO: 65.4 % — SIGNIFICANT CHANGE UP (ref 43–77)
NITRITE UR-MCNC: NEGATIVE — SIGNIFICANT CHANGE UP
OPIATES UR-MCNC: NEGATIVE — SIGNIFICANT CHANGE UP
OXYCODONE UR-MCNC: NEGATIVE — SIGNIFICANT CHANGE UP
PCP UR-MCNC: NEGATIVE — SIGNIFICANT CHANGE UP
PH UR: 6.5 — SIGNIFICANT CHANGE UP (ref 4.6–8)
PLATELET # BLD AUTO: 301 K/UL — SIGNIFICANT CHANGE UP (ref 150–400)
PMV BLD: 10.8 FL — SIGNIFICANT CHANGE UP (ref 7–13)
POTASSIUM SERPL-MCNC: 3.8 MMOL/L — SIGNIFICANT CHANGE UP (ref 3.5–5.3)
POTASSIUM SERPL-SCNC: 3.8 MMOL/L — SIGNIFICANT CHANGE UP (ref 3.5–5.3)
PROT SERPL-MCNC: 7.4 G/DL — SIGNIFICANT CHANGE UP (ref 6–8.3)
PROT UR-MCNC: 100 — SIGNIFICANT CHANGE UP
RBC # BLD: 4.97 M/UL — SIGNIFICANT CHANGE UP (ref 4.2–5.8)
RBC # FLD: 13.3 % — SIGNIFICANT CHANGE UP (ref 10.3–14.5)
RBC CASTS # UR COMP ASSIST: SIGNIFICANT CHANGE UP (ref 0–?)
SALICYLATES SERPL-MCNC: < 5 MG/DL — LOW (ref 15–30)
SODIUM SERPL-SCNC: 140 MMOL/L — SIGNIFICANT CHANGE UP (ref 135–145)
SP GR SPEC: > 1.04 — HIGH (ref 1–1.03)
SQUAMOUS # UR AUTO: SIGNIFICANT CHANGE UP
UROBILINOGEN FLD QL: 1 E.U. — SIGNIFICANT CHANGE UP (ref 0.1–0.2)
WBC # BLD: 11.45 K/UL — HIGH (ref 3.8–10.5)
WBC # FLD AUTO: 11.45 K/UL — HIGH (ref 3.8–10.5)
WBC UR QL: SIGNIFICANT CHANGE UP (ref 0–?)

## 2017-02-18 PROCEDURE — 99284 EMERGENCY DEPT VISIT MOD MDM: CPT

## 2017-02-18 PROCEDURE — 90792 PSYCH DIAG EVAL W/MED SRVCS: CPT | Mod: GC

## 2017-02-18 RX ORDER — TETANUS TOXOID, REDUCED DIPHTHERIA TOXOID AND ACELLULAR PERTUSSIS VACCINE, ADSORBED 5; 2.5; 8; 8; 2.5 [IU]/.5ML; [IU]/.5ML; UG/.5ML; UG/.5ML; UG/.5ML
0.5 SUSPENSION INTRAMUSCULAR ONCE
Qty: 0 | Refills: 0 | Status: COMPLETED | OUTPATIENT
Start: 2017-02-18 | End: 2017-02-18

## 2017-02-18 RX ADMIN — TETANUS TOXOID, REDUCED DIPHTHERIA TOXOID AND ACELLULAR PERTUSSIS VACCINE, ADSORBED 0.5 MILLILITER(S): 5; 2.5; 8; 8; 2.5 SUSPENSION INTRAMUSCULAR at 21:02

## 2017-02-18 NOTE — ED BEHAVIORAL HEALTH NOTE - BEHAVIORAL HEALTH NOTE
Writer spoke to patient's mother Hanna Bishop. She states that patient has been doing well recently & has been euthymic, sleeping well, eating well & attending partial program as scheduled. She states patient has been much more stable emotionally since leaving the hospital & feels partial has helped him to improve his coping skills. She reports she and patient are very close & speak daily about his mood & discuss any suicidality he may be having. She states patient has consistently denied any SI, intent or plan. She states that today patient told him that he has cut himself while she was out & had called his friend from partial program, "he is friends with all of the girls in partial". She states patient told her that he accidentally cut himself too deep & so he was going to the hospital to get stitches. She states patient told her that he had no suicidal intent today and she reports he told her he did it 'for fun'. She states she does not feel patient would benefit from inpatient admission as he is doing well in partial, however, she advocates for patient remaining in partial a bit longer (he is scheduled to be d/c Tuesday). She states she would like patient to be discharged to her care & states she supervises him closely. She states she will remove all sharps and medications to ensure he does not harm himself impulsively.

## 2017-02-18 NOTE — ED ADULT TRIAGE NOTE - CHIEF COMPLAINT QUOTE
Pt with depression and self cut. Pt with deep laceration to left wrist ,pt needs sutures then BH. Pt states did not want to kill himself.

## 2017-02-18 NOTE — ED BEHAVIORAL HEALTH ASSESSMENT NOTE - RISK ASSESSMENT
Patient has several non-modifiable factors that increase his chronic risk including: hx of self-injurious behaviors, hx chronic mental illness (depression, personality disorder), hx chronic suicidal ideations with 1 prior preparatory act, hx impulsivity, and family hx of mental illness. Acute risk factors include: recent hospitalization, acute self-injury today requiring medical treatment and impending change in treatment. However patient has several protective factors including: no serious suicide attempts, no hx violence, no prior legal hx, no substance use history, no access to lethal weapons, appears future oriented, engaged in treatment, denies any acute suicidal ideations/intent/plan, contracts for safety and on mental status does not appears depressed, manic or psychotic. To further mitigate risk, safety planning reviewed with patient and family, and [facility] team contacted where patient will have close follow up. As such his chronic risk for danger remains elevated but acute risk is mild. Therefore patient is appropriate for discharge with outpatient follow up.

## 2017-02-18 NOTE — ED BEHAVIORAL HEALTH ASSESSMENT NOTE - SUMMARY
21 yr old single Pakistani male with psychiatric history of borderline personality disorder, MDD, recently hospitalized at [facility] in January 2017, currently enrolled at [facility], history of suicidal ideations and 1 prior preparatory act to attempt, history of self-injurious behaviors who is domiciled with family in private residence, kicked out of college 1.5 years ago, brought in by EMT after patient's friend activated 911 due to self-report of self-inflicted injury.     Patient on presentation appears to be euthymic with reactive affect and expresses regret over his self-injurious behaviors. While he did require 13 stitches for it, he is adamant he had no suicidal intent over injury and immediately reached out for help after doing so. Patient at current time contracts for safety, denies having any suicidal ideations/intent/plan, admits to having difficulty with  from [facility] next week where he feels accepted but is willing to speak with team next week about this. He does not appear to be depressed at this time, and recent documentation from PHP support his improvement overall. His current presentation seems consistent with his chronic borderline personality disorder with baseline impulsivity, poor coping mechanisms and tendency to self-harm. Collateral from family indicates that they feel comfortable returning home at this time and they do not report acute changes in mental status recently. Safety planning to lock all sharps and pills at home reviewed with family which they agree to do so, and will reach out to [facility] at [facility] to communicate patient's visit here today. As such we will discharge patient with recommendation he follow up with PHP next week for further management.

## 2017-02-18 NOTE — ED BEHAVIORAL HEALTH ASSESSMENT NOTE - DESCRIPTION
sitting calmly, comfortable, in NAD, smiling and interactive with staff Asthma Latoya, [place], Failed out of college 1.5 years ago. 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 Lives with parents and brothers

## 2017-02-18 NOTE — ED PROVIDER NOTE - ATTENDING CONTRIBUTION TO CARE
DR. Jett, ATTENDING MD-  I performed a face to face bedside interview with patient regarding history of present illness, review of symptoms and past medical history. I completed an independent physical exam.  I have discussed patient's plan of care with the resident.   Documentation as above in the note.     20yo male hx of depression/bipolar p/w a laceration to the volar aspect of his wrist. He denies wanting to hurt or kill himself, notes that he used to cut, but hasn't in years, saw a razor blade and decided to do it again, didn't want it to go so deep, but slipped. Denies any other injuries, one episode where he wanted to kill himself in the past. Exam:  well appearing, nad lungs: CTAB Heart: rrr no murmur Abd: soft, NTND Ext: no pedal edema, laceration approximately 4cm on volar asepct of wrist, no active bleeding, no visible tendon laceration, FROM of wrist and intrinsic hand muscles  Plan: update tetanus, close laceration, and have psych eval pt DR. Jett, ATTENDING MD-  I performed a face to face bedside interview with patient regarding history of present illness, review of symptoms and past medical history. I completed an independent physical exam.  I have discussed patient's plan of care with the resident.   Documentation as above in the note.     22yo male hx of depression/bipolar p/w a laceration to the volar aspect of his wrist. He denies wanting to hurt or kill himself, notes that he used to cut, but hasn't in years, saw a razor blade and decided to do it again, didn't want it to go so deep, but slipped. Denies any other injuries, one episode where he wanted to kill himself in the past. Exam:  well appearing, nad lungs: CTAB Heart: rrr no murmur Abd: soft, NTND Ext: no pedal edema, laceration approximately 4cm on volar aspect of wrist, no active bleeding, no visible tendon laceration, FROM of wrist and intrinsic hand muscles  Plan: update tetanus, close laceration, and have psych eval pt, low risk for SI/HI at this point in time but given hx will have psych clear

## 2017-02-18 NOTE — ED BEHAVIORAL HEALTH ASSESSMENT NOTE - CASE SUMMARY
21 yr old single [place] male with psychiatric history of borderline personality disorder, MDD, recently hospitalized at [facility] in January 2017, currently enrolled at [facility], history of suicidal ideations and 1 prior preparatory act to attempt, history of self-injurious behaviors who is domiciled with family in private residence, brought in by EMT after patient's friend activated 911 due to self-report of self-inflicted injury.   during the assessment in ER pt appears to be calm collected. does not report and Si/HI. no AVH. no delusions. he is psychiatrically stable and does not need inpt hospitalization. family was informed of plan and safety plan was discussed by resident. Pt will F/U at [facility]. during his stay in ER no meds were given to him. advised him to continue home meds. he reported that today incident was an accident, he did not mean to cut deep, he was having thoughts to superficially cut himself for attention but he accidently ended up cutting deep. he denied any thoughts of wanting to harm self and contracted for safety. pt was discharged in stable condition after being medically cleared

## 2017-02-18 NOTE — ED BEHAVIORAL HEALTH ASSESSMENT NOTE - DETAILS
hx of ording pipes to poison himself with carbon monoxide, hx of self cutting Maternal Grandfather - alcohol with suicidal intent to die Maternal Grandfather ED Partial Hospitalization staff at [facility] contacted spoke with family

## 2017-02-18 NOTE — ED ADULT NURSE NOTE - OBJECTIVE STATEMENT
pt received to room 18. has large self-inflicted laceration on left inner aspect of forearm. pt states he did not want to kill himself, just likes to cut himself. used to cut himself often. pt states he accidentally cut himself too deep and called his EMT friend who told him to come to the ER. denies any SI,HI at this time. denies depression. appears to be in good spirits. laughing and joking with staff. denies any symptoms at this time. MD Garcia at bedside for lac repair. pt belongings given to pt's mom.

## 2017-02-18 NOTE — ED PROVIDER NOTE - OBJECTIVE STATEMENT
21yM hx depression, cutting behaviors, si but no suicide attempts p/w ~6cm lac to left palmar wrist after cutting it with a razor in shower today. Pt states he did not do this in a suicide attempt. No feelings of sadness or hopelessness prior to cutting himself. Pt had plans to go out to dinner with a friend later tonight. Denies si/hi or hallucinations. Has an outpatient Psychiatrist who he follows with. Denies pain to lac site, cp, sob, n/v, or any other complaints. No drug use.

## 2017-02-18 NOTE — ED BEHAVIORAL HEALTH ASSESSMENT NOTE - OTHER PAST PSYCHIATRIC HISTORY (INCLUDE DETAILS REGARDING ONSET, COURSE OF ILLNESS, INPATIENT/OUTPATIENT TREATMENT)
Hx of borderline personality disorder, MDD diagnosed at [facility] during his only inpatient hospitalization this past January 2017. Patient was admitted at that time due to having suicidal ideations and was preparing to kill self before admitting to family his intent. Started on Zoloft, discharged on 150 mg daily, and has now been attending PHP at [facility] where Wellbutrin 75 mg daily was added on. Per PHP notes patient has been doing well in program and is scheduled for discharge on 2/21 and is to follow up with Dr. Feldman at Adult OPD at [facility] on 2/24. History of chronic fleeting SI and self-injurious behaviors.

## 2017-02-18 NOTE — ED BEHAVIORAL HEALTH ASSESSMENT NOTE - SUICIDE PROTECTIVE FACTORS
Identifies reasons for living/Future oriented/Supportive social network or family/Responsibility to family and others/Positive therapeutic relationships

## 2017-02-18 NOTE — ED BEHAVIORAL HEALTH ASSESSMENT NOTE - HPI (INCLUDE ILLNESS QUALITY, SEVERITY, DURATION, TIMING, CONTEXT, MODIFYING FACTORS, ASSOCIATED SIGNS AND SYMPTOMS)
21 yr old single Haitian male with psychiatric history of borderline personality disorder, MDD, recently hospitalized at [facility] in January 2017, currently enrolled at [facility], history of suicidal ideations and 1 prior preparatory act to attempt, history of self-injurious behaviors who is domiciled with family in private residence, kicked out of college 1.5 years ago, brought in by EMT after patient's friend activated 911 due to self-report of self-inflicted injury.     Patient states he has been feeling relatively well since being in PHP for the past 3 weeks, but notes for the past few days has been feeling "worried" about discharge from partial next week. He states that he feels he is not "ready" to leave as he still doesn't "understand" why he has depression and that he should be completely symptom free before discharge. He also states that he has been feeling "stressed" due to financial reasons as he has accrued bills from his program. Today he says he was "shaving" for the first time since hospitalization, and felt an urge to impulsively self-harm due to stressors and feeling anxious. He states he was planning on just "cutting superficially" but then accidentally cut deeper then he desired. After seeing blood come out of his arm, he frantically called his friend who is "an EMS worker" to tell her what he did, who then called 911 on his behalf. Upon arrival to ER patient did require 13 stitches to left forearm where he had cut. Patient adamantly denies having any suicidal intent behind cutting, express regret over his actions, and states he feels "embarrassed " being in the hospital again. Denies having any recent suicidal ideations, reports he last had passive suicidal ideations over a week ago but reports being future oriented and feeling safe to return home. States he felt too "cowardly" to tell [facility] staff about feeling anxious in regards to discharge last week but feels he can talk to them about it next week. Denies any recent depressive symptoms, rates mood 6/10 (1 saddest, 10 happiest), denies changes in sleep, appetite, energy or concentration. See collateral note from NP for information relayed by mother - in short mother did not express concern over acute safety.

## 2017-02-19 LAB — TSH SERPL-MCNC: 2.93 UIU/ML — SIGNIFICANT CHANGE UP (ref 0.27–4.2)

## 2017-03-08 ENCOUNTER — OUTPATIENT (OUTPATIENT)
Dept: OUTPATIENT SERVICES | Facility: HOSPITAL | Age: 22
LOS: 1 days | Discharge: HOME | End: 2017-03-08

## 2017-03-12 ENCOUNTER — EMERGENCY (EMERGENCY)
Facility: HOSPITAL | Age: 22
LOS: 1 days | Discharge: HOME | End: 2017-03-12
Admitting: EMERGENCY MEDICINE
Payer: MEDICAID

## 2017-03-12 VITALS
RESPIRATION RATE: 20 BRPM | HEART RATE: 85 BPM | SYSTOLIC BLOOD PRESSURE: 136 MMHG | TEMPERATURE: 98 F | DIASTOLIC BLOOD PRESSURE: 66 MMHG | OXYGEN SATURATION: 100 %

## 2017-03-12 DIAGNOSIS — F33.0 MAJOR DEPRESSIVE DISORDER, RECURRENT, MILD: ICD-10-CM

## 2017-03-12 PROCEDURE — 90792 PSYCH DIAG EVAL W/MED SRVCS: CPT

## 2017-03-12 PROCEDURE — 99284 EMERGENCY DEPT VISIT MOD MDM: CPT

## 2017-03-12 NOTE — ED ADULT TRIAGE NOTE - CHIEF COMPLAINT QUOTE
Pt awake and alert arrives with mother, per EMS pt called suicide hot line and they where called to his home. Pt now denies wanting to hurt himself . Pt non compliant with medications

## 2017-03-12 NOTE — ED BEHAVIORAL HEALTH ASSESSMENT NOTE - PRIMARY DX
Adjustment disorder, unspecified type Deferred condition on axis II Mild episode of recurrent major depressive disorder

## 2017-03-12 NOTE — ED BEHAVIORAL HEALTH ASSESSMENT NOTE - RISK ASSESSMENT
Patient has several non-modifiable factors that increase his chronic risk including: hx of self-injurious behaviors, hx chronic mental illness (depression, personality disorder), hx chronic suicidal ideations with 1 prior preparatory act, hx impulsivity, and family hx of mental illness. Acute risk factors include: recent hospitalization, acute self-injury today requiring medical treatment and impending change in treatment. However patient has several protective factors including: no serious suicide attempts, no hx violence, no prior legal hx, no substance use history, no access to lethal weapons, appears future oriented, engaged in treatment, denies any acute suicidal ideations/intent/plan, contracts for safety and on mental status does not appears depressed, manic or psychotic. To further mitigate risk, safety planning reviewed with patient and family, and [facility] team contacted where patient will have close follow up. As such his chronic risk for danger remains elevated but acute risk is mild. Therefore patient is appropriate for discharge with outpatient follow up. Patient has several non-modifiable factors that increase his chronic risk including: hx of self-injurious behaviors, hx chronic mental illness (depression, personality disorder), hx chronic suicidal ideations with 1 prior preparatory act, hx impulsivity, and family hx of mental illness. Acute risk factors include: recent hospitalization, prior suicidal ideation today. However patient has several protective factors including: no serious suicide attempts, no hx violence, no prior legal hx, no substance use history, no access to lethal weapons, appears future oriented, engaged in treatment, denies any acute suicidal ideations/intent/plan, contracts for safety and on mental status does not appears depressed, manic or psychotic. To further mitigate risk, safety planning reviewed with patient and family, and [facility] team contacted where patient will have close follow up. As such his chronic risk for danger remains elevated but acute risk is mild. Therefore patient is appropriate for discharge with outpatient follow up.

## 2017-03-12 NOTE — ED BEHAVIORAL HEALTH ASSESSMENT NOTE - SUICIDE PROTECTIVE FACTORS
Responsibility to family and others/Supportive social network or family/Future oriented/Positive therapeutic relationships/Identifies reasons for living

## 2017-03-12 NOTE — ED PROVIDER NOTE - OBJECTIVE STATEMENT
22 y/o M hx MDD, Borderline Personality Disorder BIB parents with c/o increase sadness and suicidal ideation without a plan. States " I don't know why I'm  depress " . Admits to not taking his Zoloft and Wellbutrin  x 6 days. Denies punching or kicking any objects. Denies SOB, fever, chills, chest/abdominal discomforts. Denies visual/auditory hallucinations. Denies  homicidal ideations. Denies use of alcohol or illicit drug. Patinet c/o mild head 2 /10. Denies  N/V, dizziness or blurred vision. 20 y/o M hx MDD, Borderline Personality Disorder BIB parents with c/o increase sadness and suicidal ideation without a plan. States " I don't know why I'm  depress " . Admits to not taking his Zoloft and Wellbutrin  x 6 days. Denies punching or kicking any objects. Denies SOB, fever, chills, chest/abdominal discomforts. Denies visual/auditory hallucinations. Denies  homicidal ideations. Denies use of alcohol or illicit drug. Patient c/o mild headache  2 /10. Denies  N/V, dizziness or blurred vision.

## 2017-03-12 NOTE — ED BEHAVIORAL HEALTH ASSESSMENT NOTE - SUMMARY
21 yr old single Senegalese male with psychiatric history of borderline personality disorder, MDD, recently hospitalized at [facility] in January 2017, currently enrolled at [employer], history of suicidal ideations and 1 prior preparatory act to attempt, history of self-injurious behaviors who is domiciled with family in private residence, kicked out of college 1.5 years ago, brought in by EMT after patient's friend activated 911 due to self-report of self-inflicted injury.     Patient on presentation appears to be euthymic with reactive affect and expresses regret over his self-injurious behaviors. While he did require 13 stitches for it, he is adamant he had no suicidal intent over injury and immediately reached out for help after doing so. Patient at current time contracts for safety, denies having any suicidal ideations/intent/plan, admits to having difficulty with  from [facility] next week where he feels accepted but is willing to speak with team next week about this. He does not appear to be depressed at this time, and recent documentation from PHP support his improvement overall. His current presentation seems consistent with his chronic borderline personality disorder with baseline impulsivity, poor coping mechanisms and tendency to self-harm. Collateral from family indicates that they feel comfortable returning home at this time and they do not report acute changes in mental status recently. Safety planning to lock all sharps and pills at home reviewed with family which they agree to do so, and will reach out to [facility] at [facility] to communicate patient's visit here today. As such we will discharge patient with recommendation he follow up with PHP next week for further management. 21 yr old single Sri Lankan male with psychiatric history of borderline personality disorder, MDD, recently hospitalized at [facility] in January 2017, currently enrolled at [employer], history of suicidal ideations and 1 prior preparatory act to attempt, history of self-injurious behaviors who is domiciled with family in private residence, kicked out of college 1.5 years ago, brought in by EMT after patient's friend activated 911 due to self-report of self-inflicted injury.     Patient on presentation appears to be euthymic with reactive affect and expressing that the suicidal ideation was intermittent (fleeting) and intrusive, denying intent/plan. Denies current suicidal ideation/intent/plan. Patient contracts for safety and does not appear to be depressed at this time, and recent documentation from PHP support his improvement overall. There has not been any acute changes in mood / overall function. Patient is at baseline. His current presentation seems consistent with his major depression and chronic borderline personality disorder. Collateral from family indicates that they feel comfortable returning home at this time and they do not report acute changes in mental status recently. Safety planning to lock all sharps and pills at home reviewed with family which they agree to do so, and will reach out to [facility] to communicate patient's visit here today. As such we will discharge patient with recommendation he follow up with [facility] as per next appointment. Patient requested out-patient therapy, from which patient can benefit from, and can be arranged by OPD [facility] team. Patient is not an imminent threat to self / others, and does not meet criteria for in-patient hospitalization. Patient is safe for discharge. 21 yr old single Icelandic male with psychiatric history of borderline personality disorder, MDD, recently hospitalized at [facility] in January 2017, currently enrolled at [employer], history of suicidal ideations and 1 prior preparatory act to attempt, history of self-injurious behaviors who is domiciled with family in private residence, kicked out of college 1.5 years ago, brought in by EMT after patient's friend activated 911 due to self-report of self-inflicted injury.     Patient on presentation appears to be euthymic with reactive affect and expressing that the suicidal ideation was intermittent (fleeting) and intrusive, denying intent/plan. Denies current suicidal ideation/intent/plan. Patient contracts for safety and does not appear to be depressed at this time, and recent documentation from PHP support his improvement overall. There has not been any acute changes in mood / overall function. Patient is at baseline. His current presentation seems consistent with his major depression and chronic borderline personality disorder. Collateral from family indicates that they feel comfortable returning home at this time and they do not report acute changes in mental status recently. Safety planning to lock all sharps and pills at home reviewed with family which they agree to do so, and will reach out to [facility] to communicate patient's visit here today. As such we will discharge patient with recommendation he follow up with [facility] as per next appointment. Patient requested out-patient therapy, from which patient can benefit from, and can be arranged by [facility] team, of which was emailed. Patient is not an imminent threat to self / others, and does not meet criteria for in-patient hospitalization. Patient is safe for discharge. Patient is a 21 yr old single Palauan male with psychiatric history of borderline personality disorder, MDD, recently hospitalized at [facility] in January 2017 and was enrolled at [facility] [employer] [facility] thereafter (discharged from the program end of February), history of chronic suicidal ideations and one prior preparatory act to attempt (buying pipes for carbon monoxide poisoning), history of chronic self-injurious behaviors, with no prior suicide attempts, who is domiciled with family in private residence, kicked out of college 1.5 years ago for poor performance, was brought in by EMT after calling suicide hotline secondary to having intrusive suicidal ideation with no plan / intent.    Patient on presentation appears to be euthymic with reactive affect and expressing that the suicidal ideation was intermittent (fleeting) and intrusive, denying intent/plan. Denies current suicidal ideation/intent/plan. Patient contracts for safety and does not appear to be depressed at this time, and recent documentation from PHP support his improvement overall. There has not been any acute changes in mood / overall function. Patient is at baseline. His current presentation seems consistent with his major depression and chronic borderline personality disorder. Collateral from family indicates that they feel comfortable returning home at this time and they do not report acute changes in mental status recently. Safety planning to lock all sharps and pills at home reviewed with family which they agree to do so, and will reach out to [facility] to communicate patient's visit here today. As such we will discharge patient with recommendation he follow up with [facility] as per next appointment. Patient requested out-patient therapy, from which patient can benefit from, and can be arranged by [facility] team, of which was emailed. Patient is not an imminent threat to self / others, and does not meet criteria for in-patient hospitalization. Patient is safe for discharge.

## 2017-03-12 NOTE — ED BEHAVIORAL HEALTH ASSESSMENT NOTE - CASE SUMMARY
Pt is a 21 yr old single Kazakh male with hx of Borderline personality disorder, MDD, recently hospitalized at [facility] in January 2017, currently enrolled at [employer] [facility], history of suicidal ideations and 1 prior preparatory act to attempt, history of self-injurious behaviors who is domiciled with family in private residence, kicked out of college 1.5 years ago, was brought in by EMT after calling suicide hotline secondary to having intrusive suicidal ideation with no plan / intent. At ED, pt presents with euthymic mood with reactive/full affect and denies feeling depressed or acute changes of mood. Pt denies current suicidal or homicidal ideation/intention/plan at this time. Chart reviewed and recent PHP supports his improvement overall and is at baseline. His current presentation seems consistent with his major depression and chronic borderline personality disorder. Collateral from family indicates that they has no concern for safety and feel comfortable for pt's returning home at this time and they do not report acute changes in mental status recently. Safety planning was discussed with pt/family and pt/family verbalize understanding and agrees with the discharge plan. Pt does not present imminent danger to self or others at this time and doesn't meet criteria for inpatient hospitalization and will be discharged. Patient will follow up with outpatient psychiatrist as scheduled

## 2017-03-12 NOTE — ED BEHAVIORAL HEALTH ASSESSMENT NOTE - OTHER PAST PSYCHIATRIC HISTORY (INCLUDE DETAILS REGARDING ONSET, COURSE OF ILLNESS, INPATIENT/OUTPATIENT TREATMENT)
Hx of borderline personality disorder, MDD diagnosed at [facility] during his only inpatient hospitalization this past January 2017. Patient was admitted at that time due to having suicidal ideations and was preparing to kill self before admitting to family his intent. Started on Zoloft, discharged on 150 mg daily, and has now been attending PHP at [facility] where Wellbutrin 75 mg daily was added on. Per PHP notes patient discharged on 2/21 and treated by Dr. Feldman at Adult [facility] at [facility] and seen by psychiatrist this past Wednesday. History of chronic fleeting SI and self-injurious behaviors.

## 2017-03-12 NOTE — ED BEHAVIORAL HEALTH ASSESSMENT NOTE - OTHER
eyes red rapid limited Recent suicidal ideation Laughing appropriately throughout the interview Rapid

## 2017-03-12 NOTE — ED PROVIDER NOTE - DETAILS:
ED attending: Patient not seen by me. PA note reviewed and signed as per hospital policy. ED attending: Patient not seen by me. NP note reviewed and signed as per hospital policy.

## 2017-03-12 NOTE — ED BEHAVIORAL HEALTH ASSESSMENT NOTE - SAFETY PLAN DETAILS
family to lock all sharps, pills in home. Return to ED for worsening symptoms or safety concerns. Safety planning done with patient and mother. Mother advised to secure all sharps and medication bottles out of patient's reach at home. Mother denies having any firearms at home. They were advised to call 911 or take the patient to the nearest ER if patient's behavior worsened or if there are any safety concerns. Mother verbalized understanding.

## 2017-03-12 NOTE — ED PROVIDER NOTE - PMH
Borderline personality disorder    GERD (gastroesophageal reflux disease)    MDD (major depressive disorder)

## 2017-03-12 NOTE — ED BEHAVIORAL HEALTH ASSESSMENT NOTE - DESCRIPTION
Patient was calm and cooperative in the ED and did not exhibit any aggression. Patient did not require any PRN medications or any physical restraints. Asthma Latoya, [place], Failed out of college 1.5 years ago. 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 Lives with parents and brothers

## 2017-03-12 NOTE — ED BEHAVIORAL HEALTH ASSESSMENT NOTE - HPI (INCLUDE ILLNESS QUALITY, SEVERITY, DURATION, TIMING, CONTEXT, MODIFYING FACTORS, ASSOCIATED SIGNS AND SYMPTOMS)
Patient is a 21 yr old single Nigerien male with psychiatric history of borderline personality disorder, MDD, recently hospitalized at [facility] in January 2017 and was enrolled at [employer] [facility] thereafter (discharged from the program end of February), history of chronic suicidal ideations and one prior preparatory act to attempt (buying pipes for carbon monoxide poisoning), history of chronic self-injurious behaviors, with no prior suicide attempts, who is domiciled with family in private residence, kicked out of college 1.5 years ago for poor performance, was brought in by EMT after calling suicide hotline secondary to having intrusive suicidal ideation with no plan / intent.    Patient, who presents with euthymic mood, laughing appropriately during most of the interview, reports having chronic intermittent intrusive suicidal ideation, mostly passive in nature, however today had active suicidal ideation of killing himself, which he reports was "just a thought." Reports suicidal ideation being intrusive at the time, denying plan/intent. Denies last suicidal ideation/intent/plan was during hospitalization at [facility]. Reports today to have called suicide hotline "just to talk about the thought," stating not having a plan / desire to act on them, stating "there is a difference between thought and action." Reports suicide hotline calling EMS being unwarranted and the suicidal ideation was not emergency given the chronicity of him experiencing intermittent intrusive suicidal ideation. Denies current suicidal ideation/intent/plan homicidal ideation/intent/plan. Reports self-discontinuing his medications because he "wanted to see withdrawal symptoms" and thus be able to conclude if medications were "working or not." Denies experiencing withdrawal symptoms. Denies acute changes in mood, stating depressive symptoms being chronic. Reports depressive symptoms of persistent sad mood, with anhedonia, fatigue, chronically low appetite, low motivation, and intermittent hopelessness, denying helplessness, worthlessness, guilt feelings, difficulty concentrating, and difficulty falling asleep. Denies manic symptoms including elevated mood, increased irritability, mood lability, distractibility, grandiosity, pressured speech, increase in goal-directed activity, or decreased need for sleep. Denies anxiety / psychotic symptoms. Reports being at baseline and "stable." Reports to have seen out-patient psychiatrist on Wednesday who stated that his suicidality / behaviors are attention seeking in nature. Denies that being the case. Reports enjoying hobbies of watching Netflix and spending time with friends. Reports positive therapeutic relationships and strong social supports. Reports future orientation, wanting to go back to college, with motivation to continue outpatient treatment, stating wanting to start out-patient therapy as he is currently only seeing a psychiatrist.     Collateral provided by mother, who corroborates patient history, adding that patient called suicide hotline, and when ambulance arrived, he was sound asleep. Denies patient expressing suicidal ideation/intent/plan to mother. Denies recent self-injurious behaviors. Reports patient's and overall function has been stable, including sleep and appetite, with no acute changes. Denies acute changes to mood. Reports patient is at baseline. Denies safety concerns.

## 2017-03-12 NOTE — ED ADULT NURSE NOTE - OBJECTIVE STATEMENT
Received pt in  pt calm & cooperative c/o depression verbalizing he called the suicidal hot line earlier this evening. pt denies Si/Hi/AVh at present no c/o pain reassurance provided eval on going.

## 2017-03-12 NOTE — ED PROVIDER NOTE - MEDICAL DECISION MAKING DETAILS
20 y/o M hx MDD, Borderline Personality Disorder  No evidence of physical injuries, broken skin or deformities.   Medical evaluation performed. There is no clinical evidence of intoxication or any acute medical problem requiring immediate intervention. Patient is awaiting psychiatric consultation. Final disposition will be determined by psychiatrist.

## 2017-03-12 NOTE — ED ADULT NURSE REASSESSMENT NOTE - NS ED NURSE REASSESS COMMENT FT1
Pt calm & cooperative denies Si/Hi/AVh  at present pt d/c verbalizing understanding of d/c instructions

## 2017-03-12 NOTE — ED BEHAVIORAL HEALTH ASSESSMENT NOTE - DETAILS
history of ordering pipes to poison himself with carbon monoxide, hx of self cutting Maternal Grandfather - alcohol with suicidal intent to die Maternal Grandfather spoke with family

## 2017-03-12 NOTE — ED BEHAVIORAL HEALTH ASSESSMENT NOTE - REFERRAL / APPOINTMENT DETAILS
follow up at [facility] PHP next week as scheduled Patient to follow-up with outpatient therapist/psychiatrist as per scheduled appointment.

## 2017-03-21 DIAGNOSIS — F60.3 BORDERLINE PERSONALITY DISORDER: ICD-10-CM

## 2017-04-27 NOTE — ED BEHAVIORAL HEALTH ASSESSMENT NOTE - PERCEPTIONS
1. Schedule with dermatology for a skin check.  2. Schedule with general surgery for lipoma removal.  3. Start taking levothyroxine 100 mcg daily.  4. Schedule a lab draw in 6-8 weeks.      When You Have Low Back Pain    Caring for Your Back  You are not alone.    Low back pain is very common. Nearly half of all adults have low back pain in any given year. The good news is that back pain is rarely a danger to your health. Most people can manage their back pain on their own and about half of them start feeling better within 2 weeks. In 9 out of 10 cases, low back pain goes away or no longer limits daily activity within 6 weeks.     Your outlook is good!    Your symptoms tell us that your low back pain is most likely not a danger to you. Most of the time we do not know the exact cause of low back pain, even if you see a doctor or have an MRI. However, treatment can still work without knowing the cause of the pain. Less than 1 in 100 people need surgery for their back pain.     What can I do about my low back pain?     There are three things you can do to ease low back pain and help it go away.    Use heat or cold packs.    Take medicine as directed.    Use positions, movements and exercises.     Using heat or cold packs    Try cold packs or gentle heat to ease your pain. Use whichever gives you the most relief. Apply the cold pack or heat for 15 minutes at a time, as often as needed.    Taking medicine      If your doctor has prescribed medicine, be sure to follow the directions.    If you take over-the-counter medicine, read and follow the directions.    Talk to your doctor if you have any questions.     Using positions, movements and exercises    Research tells us that moving your joints and muscles can help you recover from back pain. Such activity should be simple and gentle. Use the positions in the photos as well as walking to help relieve your pain. Try taking a short walk every 3 to 4 hours during the day.  Walk for a few minutes inside your home or take longer walks outside, on a treadmill or at a mall. Slowly increase the amount of time you walk. Expect discomfort when you begin, but it should lessen as your back starts to heal. When your back feels better, walk daily to keep your back and body healthy.    Finding a comfortable position    When your back pain is new, certain positions will ease your pain. Gently try each of the positions below until you find one that is helpful. Once you find a position of comfort, use it as often as you like when you are resting. You will recover faster if you combine rest with activity.         Lie on your back with your legs bent. You can do this by placing a pillow under your knees. Or you may lie on the floor and rest your lower legs on the seat of a chair.       Lie on your side with your knees bent, and place a pillow between your knees.       Lie on your stomach over pillows.      When should I call my doctor?    Your back pain should improve over the first couple of weeks. As it improves, you should be able to return to your normal activities. But call your doctor if:    You have a sudden change in your ability to control your bladder or bowels.    You feel tingling in your groin or legs.    The pain spreads down your leg and into your foot.    Your toes, feet or leg muscles feel weak.    You feel generally unwell or sick.    Your pain does not get better or gets worse.      If you are deaf or hard of hearing, please let us know. We provide many free services including sign language interpreters,oral interpreters, TTYs, telephone amplifiers, note takers and written materials.    For informational purposes only. Not to replace the advice of your health care provider. Copyright   2013 [employer]. All rights reserved. [employer] 182871 - Rev 06/14.     No abnormalities

## 2017-05-18 NOTE — ED PROVIDER NOTE - CPE EDP HEME LYMPH NORM
7582 Bedside SBAR shift report given to Chacorta Dubose. Pt sitting up in bed. No acute distress noted. normal...

## 2022-03-21 NOTE — ED ADULT TRIAGE NOTE - PAIN: PRESENCE, MLM
I am planning on doing a peer to peer, I just have not had time to do it yet   complains of pain/discomfort

## 2023-05-04 NOTE — ED BEHAVIORAL HEALTH ASSESSMENT NOTE - GAF
Patient alert and oriented at this time. Patient ambulatory on discharge. Patient respirations are even and unlabored at this time. Patient skin appropriate for ethnicity, warm, and dry. Patient states decreased pain at this time. Discharge instructions reviewed with patient. Patient verbalized understanding and states no questions at this time.         Sarthak Palmer RN  05/04/23 [id number]
[id number]